# Patient Record
Sex: FEMALE | Race: BLACK OR AFRICAN AMERICAN | NOT HISPANIC OR LATINO | Employment: UNEMPLOYED | URBAN - NONMETROPOLITAN AREA
[De-identification: names, ages, dates, MRNs, and addresses within clinical notes are randomized per-mention and may not be internally consistent; named-entity substitution may affect disease eponyms.]

---

## 2020-04-04 ENCOUNTER — HOSPITAL ENCOUNTER (EMERGENCY)
Facility: HOSPITAL | Age: 20
Discharge: HOME | End: 2020-04-04
Attending: EMERGENCY MEDICINE

## 2020-04-04 VITALS
BODY MASS INDEX: 20.89 KG/M2 | WEIGHT: 130 LBS | HEART RATE: 72 BPM | SYSTOLIC BLOOD PRESSURE: 111 MMHG | TEMPERATURE: 98 F | RESPIRATION RATE: 20 BRPM | HEIGHT: 66 IN | OXYGEN SATURATION: 100 % | DIASTOLIC BLOOD PRESSURE: 80 MMHG

## 2020-04-04 DIAGNOSIS — M62.838 MUSCLE SPASM: Primary | ICD-10-CM

## 2020-04-04 LAB
ALBUMIN SERPL-MCNC: 4.4 G/DL (ref 3.5–5.2)
ALBUMIN/GLOB SERPL: 1.3 G/DL
ALP SERPL-CCNC: 81 U/L (ref 39–117)
ALT SERPL W P-5'-P-CCNC: 10 U/L (ref 1–33)
ANION GAP SERPL CALCULATED.3IONS-SCNC: 12 MMOL/L (ref 5–15)
AST SERPL-CCNC: 13 U/L (ref 1–32)
B-HCG UR QL: NEGATIVE
BASOPHILS # BLD AUTO: 0.02 10*3/MM3 (ref 0–0.2)
BASOPHILS NFR BLD AUTO: 0.4 % (ref 0–1.5)
BILIRUB SERPL-MCNC: 0.3 MG/DL (ref 0.2–1.2)
BUN BLD-MCNC: 10 MG/DL (ref 6–20)
BUN/CREAT SERPL: 14.5 (ref 7–25)
CALCIUM SPEC-SCNC: 9.3 MG/DL (ref 8.6–10.5)
CHLORIDE SERPL-SCNC: 102 MMOL/L (ref 98–107)
CO2 SERPL-SCNC: 25 MMOL/L (ref 22–29)
CREAT BLD-MCNC: 0.69 MG/DL (ref 0.57–1)
DEPRECATED RDW RBC AUTO: 44.1 FL (ref 37–54)
EOSINOPHIL # BLD AUTO: 0.07 10*3/MM3 (ref 0–0.4)
EOSINOPHIL NFR BLD AUTO: 1.4 % (ref 0.3–6.2)
ERYTHROCYTE [DISTWIDTH] IN BLOOD BY AUTOMATED COUNT: 14.5 % (ref 12.3–15.4)
GFR SERPL CREATININE-BSD FRML MDRD: 129 ML/MIN/1.73
GLOBULIN UR ELPH-MCNC: 3.4 GM/DL
GLUCOSE BLD-MCNC: 92 MG/DL (ref 65–99)
HCT VFR BLD AUTO: 36.6 % (ref 34–46.6)
HGB BLD-MCNC: 11.7 G/DL (ref 12–15.9)
IMM GRANULOCYTES # BLD AUTO: 0.01 10*3/MM3 (ref 0–0.05)
IMM GRANULOCYTES NFR BLD AUTO: 0.2 % (ref 0–0.5)
LYMPHOCYTES # BLD AUTO: 1.98 10*3/MM3 (ref 0.7–3.1)
LYMPHOCYTES NFR BLD AUTO: 40.7 % (ref 19.6–45.3)
MAGNESIUM SERPL-MCNC: 1.9 MG/DL (ref 1.6–2.6)
MCH RBC QN AUTO: 26.6 PG (ref 26.6–33)
MCHC RBC AUTO-ENTMCNC: 32 G/DL (ref 31.5–35.7)
MCV RBC AUTO: 83.2 FL (ref 79–97)
MONOCYTES # BLD AUTO: 0.45 10*3/MM3 (ref 0.1–0.9)
MONOCYTES NFR BLD AUTO: 9.3 % (ref 5–12)
NEUTROPHILS # BLD AUTO: 2.33 10*3/MM3 (ref 1.7–7)
NEUTROPHILS NFR BLD AUTO: 48 % (ref 42.7–76)
NRBC BLD AUTO-RTO: 0 /100 WBC (ref 0–0.2)
PLATELET # BLD AUTO: 229 10*3/MM3 (ref 140–450)
PMV BLD AUTO: 11.6 FL (ref 6–12)
POTASSIUM BLD-SCNC: 4.4 MMOL/L (ref 3.5–5.2)
PROT SERPL-MCNC: 7.8 G/DL (ref 6–8.5)
RBC # BLD AUTO: 4.4 10*6/MM3 (ref 3.77–5.28)
SODIUM BLD-SCNC: 139 MMOL/L (ref 136–145)
WBC NRBC COR # BLD: 4.86 10*3/MM3 (ref 3.4–10.8)

## 2020-04-04 PROCEDURE — 81025 URINE PREGNANCY TEST: CPT | Performed by: PHYSICIAN ASSISTANT

## 2020-04-04 PROCEDURE — 85025 COMPLETE CBC W/AUTO DIFF WBC: CPT | Performed by: PHYSICIAN ASSISTANT

## 2020-04-04 PROCEDURE — 80053 COMPREHEN METABOLIC PANEL: CPT | Performed by: PHYSICIAN ASSISTANT

## 2020-04-04 PROCEDURE — 36415 COLL VENOUS BLD VENIPUNCTURE: CPT

## 2020-04-04 PROCEDURE — 25010000002 KETOROLAC TROMETHAMINE PER 15 MG: Performed by: PHYSICIAN ASSISTANT

## 2020-04-04 PROCEDURE — 96372 THER/PROPH/DIAG INJ SC/IM: CPT

## 2020-04-04 PROCEDURE — 99283 EMERGENCY DEPT VISIT LOW MDM: CPT

## 2020-04-04 PROCEDURE — 83735 ASSAY OF MAGNESIUM: CPT | Performed by: PHYSICIAN ASSISTANT

## 2020-04-04 RX ORDER — CYCLOBENZAPRINE HCL 10 MG
10 TABLET ORAL ONCE
Status: COMPLETED | OUTPATIENT
Start: 2020-04-04 | End: 2020-04-04

## 2020-04-04 RX ORDER — CYCLOBENZAPRINE HCL 5 MG
5 TABLET ORAL 3 TIMES DAILY PRN
Qty: 6 TABLET | Refills: 0 | Status: SHIPPED | OUTPATIENT
Start: 2020-04-04 | End: 2020-04-06

## 2020-04-04 RX ORDER — KETOROLAC TROMETHAMINE 30 MG/ML
30 INJECTION, SOLUTION INTRAMUSCULAR; INTRAVENOUS ONCE
Status: COMPLETED | OUTPATIENT
Start: 2020-04-04 | End: 2020-04-04

## 2020-04-04 RX ADMIN — CYCLOBENZAPRINE HYDROCHLORIDE 10 MG: 10 TABLET, FILM COATED ORAL at 14:47

## 2020-04-04 RX ADMIN — KETOROLAC TROMETHAMINE 30 MG: 30 INJECTION, SOLUTION INTRAMUSCULAR at 14:47
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
## 2020-04-04 NOTE — DISCHARGE INSTRUCTIONS
Continue taking ibuprofen and Tylenol as directed-400 mg ibuprofen and or 500 mg Tylenol every 6 hours.  Use muscle relaxers as directed, do not take while driving.  May use warm compress and massage to help with pain.  You will need to follow-up with a primary care provider may contact [facility]. Return to ER for any change, worsening of symptoms, or any additional concerns.